# Patient Record
Sex: FEMALE | Race: WHITE | NOT HISPANIC OR LATINO
[De-identification: names, ages, dates, MRNs, and addresses within clinical notes are randomized per-mention and may not be internally consistent; named-entity substitution may affect disease eponyms.]

---

## 2018-02-12 ENCOUNTER — APPOINTMENT (OUTPATIENT)
Dept: VASCULAR SURGERY | Facility: CLINIC | Age: 58
End: 2018-02-12
Payer: COMMERCIAL

## 2018-02-12 DIAGNOSIS — R25.2 CRAMP AND SPASM: ICD-10-CM

## 2018-02-12 DIAGNOSIS — Z86.79 PERSONAL HISTORY OF OTHER DISEASES OF THE CIRCULATORY SYSTEM: ICD-10-CM

## 2018-02-12 DIAGNOSIS — Z78.9 OTHER SPECIFIED HEALTH STATUS: ICD-10-CM

## 2018-02-12 PROCEDURE — 99243 OFF/OP CNSLTJ NEW/EST LOW 30: CPT | Mod: 25

## 2018-02-12 PROCEDURE — 93970 EXTREMITY STUDY: CPT

## 2018-02-13 PROBLEM — Z86.79 HISTORY OF HYPERTENSION: Status: RESOLVED | Noted: 2018-02-13 | Resolved: 2018-02-13

## 2018-02-13 PROBLEM — Z78.9 NON-SMOKER: Status: ACTIVE | Noted: 2018-02-13

## 2018-02-13 PROBLEM — R25.2 CRAMPS OF LOWER EXTREMITY: Status: ACTIVE | Noted: 2018-02-13

## 2021-12-17 ENCOUNTER — APPOINTMENT (OUTPATIENT)
Dept: VASCULAR SURGERY | Facility: CLINIC | Age: 61
End: 2021-12-17
Payer: COMMERCIAL

## 2021-12-17 VITALS
WEIGHT: 190 LBS | SYSTOLIC BLOOD PRESSURE: 123 MMHG | HEART RATE: 54 BPM | HEIGHT: 63 IN | DIASTOLIC BLOOD PRESSURE: 79 MMHG | BODY MASS INDEX: 33.66 KG/M2

## 2021-12-17 PROCEDURE — 99203 OFFICE O/P NEW LOW 30 MIN: CPT

## 2021-12-17 PROCEDURE — 93923 UPR/LXTR ART STDY 3+ LVLS: CPT

## 2022-01-11 NOTE — HISTORY OF PRESENT ILLNESS
[FreeTextEntry1] : 60 yo F with PMHx of HTN presents for a follow up due to pain and cramps of her upper and lower extremities. Patient was initially evaluated in 2018 and had no vascular conditions at that time. Patient experiences diffuse LE pains and she was evaluated by multiple specialist and was recommended to see a vascular doctor again. She states that she experiences cramps that wake her up from sleep at night. Patient admits to lower back problems. She denies claudication, hx of DVT.

## 2022-01-11 NOTE — ASSESSMENT
[FreeTextEntry1] : 62 yo F with PMHx of HTN presents for a follow up due to pain and cramps of her upper and lower extremities. \par Normal LD/PVR and peripheral pulses.\par No vascular issues at this time.\par Patient was recommended to f/u with her spine specialist.\par F/u prn\par  [Arterial/Venous Disease] : arterial/venous disease

## 2022-01-11 NOTE — PHYSICAL EXAM
[2+] : left 2+ [Varicose Veins Of Lower Extremities] : bilaterally [Ankle Swelling On The Right] : mild [No Rash or Lesion] : No rash or lesion [Alert] : alert [Calm] : calm [Ankle Swelling (On Exam)] : not present [Abdomen Tenderness] : ~T ~M No abdominal tenderness [de-identified] : Obese habitus, NAD [de-identified] : NC/AT [de-identified] : +FROM [de-identified] : grossly intact

## 2022-01-11 NOTE — ADDENDUM
[FreeTextEntry1] : \par \par I, Dr.Vicken Peña, personally performed the evaluation and management (E/M) services for this new patient.  That E/M includes conducting the initial examination, assessing all conditions, and establishing the plan of care.  Today, my ACP, JOSH Galvan, was here to observe my evaluation and management services for this patient to be followed going forward.\par \par \par